# Patient Record
Sex: MALE | Race: BLACK OR AFRICAN AMERICAN | Employment: UNEMPLOYED | URBAN - METROPOLITAN AREA
[De-identification: names, ages, dates, MRNs, and addresses within clinical notes are randomized per-mention and may not be internally consistent; named-entity substitution may affect disease eponyms.]

---

## 2017-04-14 ENCOUNTER — HOSPITAL ENCOUNTER (OUTPATIENT)
Dept: NUTRITION | Age: 67
Discharge: HOME | End: 2017-04-14
Payer: MEDICARE

## 2017-04-14 PROCEDURE — 97802 MEDICAL NUTRITION INDIV IN: CPT

## 2017-04-14 NOTE — PROGRESS NOTES
[address] [place] [address], [address] [place] [address] [phone]   Nutrition Assessment  Medical Nutrition Therapy   Outpatient  Initial Evaluation         Patient Name: Taye Recio. : 1950   Treatment Diagnosis: Diabetes Onset Date:    Referral Source: [facility], Not On File, MD Start of Care [facility]): 2017     Ht:  68 in  cm Wt: 245 lb  kg   BMI: 37.3  BMR male 18   BMR female      Diagnosis: Diabetes        Medications of Nutritional Significance:   Lantus, Novalog     Subjective/Assessment: Client is a 78year old male. He is being seen today to receive information on a diabetic diet and for weight loss. He has a history of liver and kidney transplant. He is not as active as he would like to be due to pain in his arm from an old fistula and back pain. This does cause him to be depressed. He was A1c was 6.3 back in 2017. He has good control over his blood sugars. He lives with his wife and granddaughter. They both do the grocery shopping and the cooking. He enjoys eating a varied diet. Current Eating Patterns: Client's meals are inconsistent in carbohydrate intake. He does not count carbohydrates. His breakfast is high in carbohydrate intake with no protein and then his lunch and dinner has minimal carbohydrates. His meal and snack timing is also inconsistent and can vary from day to day depending on what is going on with appointments. His snack intake can vary as well.        Handouts/  Information Provided: [x]  Carbohydrates  [x]  Protein  [x]  Fiber  []  Serving Sizes  [x]  Meal and Snack Ideas  []  Food Journals [x]  Diabetes  []  Cholesterol  []  Sodium  [x]  Gen Nutr Guidelines  []  SBGM Guidelines  []  Others:    Estimate Needs:   Calories: 1600 Protein: 100 Carbs: 180 Fat: 53   Kcal/day  g/day  g/day  g/day         percent: 25 percent: 45 percent: 30     Nutritional Goal - To promote lifestyle changes to result in:    [x]  weight loss  [x]  Improved diabetic control  []  Decreased cholesterol levels  []  Decreased blood pressure  []  weight maintenance []  Preventing any interactions associated with food allergies  []  Adequate weight gain toward goal weight  []  Other:          Recommendations: 1. Always pair a carbohydrate with a protein for all snacks and meals. 2. Be consistent in your carbohydrate intake for all meals and snacks. 3. Eat your meals and your snacks at the same time of the day.    4. Use the Plate Method for lunch and dinner      Information Reviewed with: Patient and wife   Potential Barriers to Learning: None      Dietitian Signature: Karen Rodriguez M.S.Ed, BSN, RN Date: 4/14/2017   Follow-up: Tuesday May 16 @10am Time: 2:54 PM

## 2017-05-16 ENCOUNTER — HOSPITAL ENCOUNTER (OUTPATIENT)
Dept: NUTRITION | Age: 67
Discharge: HOME | End: 2017-05-16
Payer: MEDICARE

## 2017-05-16 PROCEDURE — 97803 MED NUTRITION INDIV SUBSEQ: CPT

## 2017-05-16 NOTE — PROGRESS NOTES
NUTRITION  DAILY TREATMENT NOTE  Patient Name: Robyn Caceres. Date: 2017  : 1950    YES Patient  Verified  Insurance: Payor: Ashley London / Plan: [place] & B / Product Type: Medicare /    Diagnosis: In time:   10             Out time:   10:30   Total Treatment Time (min):   30 mins     SUBJECTIVE    Medication Changes/New allergies or changes in medical history, any new surgeries or procedures? NO    If yes, update Summary List   Subjective Functional Status/Changes:  []  No changes reported   Mr. Tiffanie Encarnacion is continuing to make dietary changes in his lifestyle. Sometimes he still has trouble eating enough food during the day because of lack of appetite but he tries to eat some type of food. During these times, he does not make the healthiest choice (yesterday he ate a hot dog because he had a taste for them) and chicken wings. Most days he eats 2-3 servings of fruit and 2-3 servings of vegetables. He is losing some weight which he is very happy about. During the 4 weeks, he lost almost 2 pounds. He remains very upbeat and positive. Current Wt: 243.2 Previous Wt: 245 Wt Change: -1.8     OBJECTIVE    Patient Education:  []  Review current plan with patient   []  Other:    Handouts/  Information Provided: []  Carbohydrates  []  Protein  []  Fiber  []  Serving Sizes  []  Fluids  []  General guidelines []  Diabetes  []  Cholesterol  []  Sodium  []  SBGM  []  Food Journals  []  Others:    Estimated Needs:        Calories Protein CHO Fat     ASSESSMENT    []  Pt Progressing Well  [x]  Slow Progress []  No Progress    Other:    []  Understand Dietary Changes/Recs []  No Change [x]  Improving []  N/A   []  Weight []  No Change []  Improving []  N/A     Glucose Levels []  No Change []  Improving []  N/A   []  Cholesterol Levels []  No Change []  Improving []  N/A     RECOMMENDATIONS    1.  Never eat a carbohydrate alone so always eat some protein with the carbohydrate. 2. Limit processed meat to no more than twice a week. 3. Look up recipes for cold quinoa and bean salads and try a recipe within the next 2 weeks. Email me and tell me what you thought of it. 4.    5.       PLAN    [x]  Continue on current plan []  Follow-up PRN   []  Discharge due to :    [x]  Next Appt[de-identified] Tuesday July [email or web address]      Dietitian: Lauren Sanabria M.S.Ed, BSN, RN    Date: 5/16/2017 Time: 10:53 AM

## 2018-08-02 PROBLEM — E66.01 SEVERE OBESITY (BMI 35.0-39.9): Status: ACTIVE | Noted: 2018-08-02

## 2021-09-16 ENCOUNTER — HOSPITAL ENCOUNTER (EMERGENCY)
Age: 71
Discharge: HOME | End: 2021-09-16
Attending: EMERGENCY MEDICINE
Payer: MEDICARE

## 2021-09-16 VITALS
DIASTOLIC BLOOD PRESSURE: 84 MMHG | OXYGEN SATURATION: 100 % | HEART RATE: 84 BPM | HEIGHT: 68 IN | WEIGHT: 245 LBS | TEMPERATURE: 98.6 F | BODY MASS INDEX: 37.13 KG/M2 | RESPIRATION RATE: 21 BRPM | SYSTOLIC BLOOD PRESSURE: 142 MMHG

## 2021-09-16 DIAGNOSIS — G25.2 COARSE TREMORS: Primary | ICD-10-CM

## 2021-09-16 LAB
ALBUMIN SERPL-MCNC: 3.6 G/DL (ref 3.4–5)
ALBUMIN/GLOB SERPL: 1.1 {RATIO} (ref 0.8–1.7)
ALP SERPL-CCNC: 62 U/L (ref 45–117)
ALT SERPL-CCNC: 12 U/L (ref 16–61)
ANION GAP SERPL CALC-SCNC: 8 MMOL/L (ref 3–18)
AST SERPL-CCNC: 6 U/L (ref 10–38)
BASOPHILS # BLD: 0 K/UL (ref 0–0.1)
BASOPHILS NFR BLD: 0 % (ref 0–2)
BILIRUB SERPL-MCNC: 0.4 MG/DL (ref 0.2–1)
BUN SERPL-MCNC: 72 MG/DL (ref 7–18)
BUN/CREAT SERPL: 10 (ref 12–20)
CALCIUM SERPL-MCNC: 9.1 MG/DL (ref 8.5–10.1)
CHLORIDE SERPL-SCNC: 104 MMOL/L (ref 100–111)
CO2 SERPL-SCNC: 28 MMOL/L (ref 21–32)
CREAT SERPL-MCNC: 7.12 MG/DL (ref 0.6–1.3)
DIFFERENTIAL METHOD BLD: ABNORMAL
EOSINOPHIL # BLD: 0 K/UL (ref 0–0.4)
EOSINOPHIL NFR BLD: 0 % (ref 0–5)
ERYTHROCYTE [DISTWIDTH] IN BLOOD BY AUTOMATED COUNT: 14 % (ref 11.6–14.5)
GLOBULIN SER CALC-MCNC: 3.4 G/DL (ref 2–4)
GLUCOSE SERPL-MCNC: 121 MG/DL (ref 74–99)
HCT VFR BLD AUTO: 26 % (ref 36–48)
HGB BLD-MCNC: 8.1 G/DL (ref 13–16)
LYMPHOCYTES # BLD: 1.2 K/UL (ref 0.9–3.6)
LYMPHOCYTES NFR BLD: 16 % (ref 21–52)
MAGNESIUM SERPL-MCNC: 2.1 MG/DL (ref 1.6–2.6)
MCH RBC QN AUTO: 30.1 PG (ref 24–34)
MCHC RBC AUTO-ENTMCNC: 31.2 G/DL (ref 31–37)
MCV RBC AUTO: 96.7 FL (ref 78–100)
MONOCYTES # BLD: 0.8 K/UL (ref 0.05–1.2)
MONOCYTES NFR BLD: 11 % (ref 3–10)
NEUTS SEG # BLD: 5.5 K/UL (ref 1.8–8)
NEUTS SEG NFR BLD: 72 % (ref 40–73)
PLATELET # BLD AUTO: 215 K/UL (ref 135–420)
PMV BLD AUTO: 11.5 FL (ref 9.2–11.8)
POTASSIUM SERPL-SCNC: 4 MMOL/L (ref 3.5–5.5)
PROT SERPL-MCNC: 7 G/DL (ref 6.4–8.2)
RBC # BLD AUTO: 2.69 M/UL (ref 4.35–5.65)
SODIUM SERPL-SCNC: 140 MMOL/L (ref 136–145)
WBC # BLD AUTO: 7.6 K/UL (ref 4.6–13.2)

## 2021-09-16 PROCEDURE — 80053 COMPREHEN METABOLIC PANEL: CPT

## 2021-09-16 PROCEDURE — 85025 COMPLETE CBC W/AUTO DIFF WBC: CPT

## 2021-09-16 PROCEDURE — 99282 EMERGENCY DEPT VISIT SF MDM: CPT

## 2021-09-16 PROCEDURE — 74011250636 HC RX REV CODE- 250/636: Performed by: PHYSICIAN ASSISTANT

## 2021-09-16 PROCEDURE — 96374 THER/PROPH/DIAG INJ IV PUSH: CPT

## 2021-09-16 PROCEDURE — 83735 ASSAY OF MAGNESIUM: CPT

## 2021-09-16 RX ORDER — LORAZEPAM 2 MG/ML
1 INJECTION INTRAMUSCULAR
Status: COMPLETED | OUTPATIENT
Start: 2021-09-16 | End: 2021-09-16

## 2021-09-16 RX ADMIN — LORAZEPAM 1 MG: 2 INJECTION INTRAMUSCULAR; INTRAVENOUS at 13:57

## 2021-09-16 NOTE — ED PROVIDER NOTES
EMERGENCY DEPARTMENT HISTORY AND PHYSICAL EXAM      Date: 9/16/2021  Patient Name: Ruperto Wong. History of Presenting Illness     Chief Complaint   Patient presents with    Abnormal Lab Results       History Provided By: Patient    HPI: Ruperto Wong., 73 y.o. male PMHx significant for cholesterolemia, ESRD, COPD, DM, anemia BPH, GERD, dyslipidemia, CAD, schizoaffective disorder, sleep apnea, lyme disease, shingles, presents ambulatory to the ED. Pt reports involuntary movements began yesterday after dialysis. Patient reports similar sx occurred about 2 months ago and he underwent full neurological testing. Pt reports last time sx were fully relieved with IV Ativan. Denies CP, SOB, dizziness. Denies new medication. Denies headache, blurred vision, dizziness, weakness. Pt has not taken anything for sx. Pt reports he has attended dialysis as scheduled, MWF. There are no other complaints, changes, or physical findings at this time. PCP: Ana Ochoa MD    No current facility-administered medications on file prior to encounter. Current Outpatient Medications on File Prior to Encounter   Medication Sig Dispense Refill    finasteride (PROSCAR) 5 mg tablet Take 5 mg by mouth daily.  pregabalin (LYRICA) 25 mg capsule Take  by mouth.  capsicum oleoresin (TRIXAICIN) 0.025 % topical cream Apply  to affected area three (3) times daily.  diazePAM (VALIUM) 5 mg tablet Take 5 mg by mouth every six (6) hours as needed for Anxiety.  mirabegron ER (MYRBETRIQ) 25 mg ER tablet Take 1 Tab by mouth daily. 90 Tab 3    amLODIPine (NORVASC) 10 mg tablet Take  by mouth daily.  mineral oil-hydrophil petrolat (AQUAPHOR) ointment Apply  to affected area as needed for Dry Skin.  aspirin delayed-release 81 mg tablet Take  by mouth daily.  atovaquone-proguanil (MALARONE) 250-100 mg per tablet Take  by mouth.  bumetanide (BUMEX) 0.5 mg tablet Take  by mouth daily.       carboxymethylcellulose sodium 1 % dlgl Apply  to eye.  cholecalciferol (VITAMIN D3) 1,000 unit cap Take  by mouth daily.  docusate sodium (COLACE) 100 mg capsule Take 100 mg by mouth two (2) times a day.  hydrALAZINE (APRESOLINE) 10 mg tablet Take  by mouth.  insulin aspart (NOVOLOG) 100 unit/mL inpn by SubCUTAneous route.  isosorbide mononitrate (ISMO, MONOKET) 20 mg tablet Take  by mouth two (2) times a day.  insulin glargine (LANTUS,BASAGLAR) 100 unit/mL (3 mL) inpn by SubCUTAneous route.  labetalol (NORMODYNE) 100 mg tablet Take  by mouth two (2) times a day.  lidocaine (XYLOCAINE) 4 % topical cream Apply  to affected area.  LORazepam (ATIVAN) 0.5 mg tablet Take  by mouth.  Magnesium Oxide 500 mg cap Take  by mouth.  meclizine (ANTIVERT) 12.5 mg tablet Take  by mouth three (3) times daily as needed.  methocarbamol (ROBAXIN) 500 mg tablet Take  by mouth four (4) times daily.  mycophenolate sodium (MYFORTIC) 360 mg delayed release tablet Take  by mouth two (2) times a day.  tacrolimus (PROGRAF) 1 mg capsule Take  by mouth every twelve (12) hours.  terazosin (HYTRIN) 1 mg capsule Take 5 mg by mouth nightly.  traMADol (ULTRAM) 50 mg tablet Take 50 mg by mouth every six (6) hours as needed for Pain.          Past History     Past Medical History:  Past Medical History:   Diagnosis Date    Anemia     Anemia in chronic kidney disease     Asthma     Benign prostatic hyperplasia with lower urinary tract symptoms     BPH (benign prostatic hyperplasia)     Cardiac arrhythmia     Carpal tunnel syndrome     Cirrhosis (HCC)     CKD (chronic kidney disease)     COPD (chronic obstructive pulmonary disease) (HCC)     Coronary artery disease     Diabetes ([address]     type 2    Diabetic peripheral neuropathy associated with type 2 diabetes mellitus ([address]     Diabetic retinopathy, nonproliferative, mild ([address]     Dyslipidemia     ED (erectile dysfunction)     Esophageal reflux     ESRD (end stage renal disease) on dialysis (HCC)     Foot drop     GERD (gastroesophageal reflux disease)     Heart abnormality     Hepatic encephalopathy (HCC)     Hepatitis C     History of blood transfusion     History of stroke     Hypercholesterolemia     Hypertension     Hypomagnesemia     Kidney disease     Lyme disease     Microhematuria     Nephrotic range proteinuria     OAB (overactive bladder)     Prostatism     PTSD (post-traumatic stress disorder)     Schizoaffective disorder (HCC)     Schwannoma of cranial nerve (HCC)     Secondary hyperparathyroidism ([address]     Shingles     Sleep apnea     Spinal stenosis     Subdural hematoma (HCC)     Transplanted kidney     Transplanted liver ([address]     Vision decreased        Past Surgical History:  Past Surgical History:   Procedure Laterality Date    HX CHOLECYSTECTOMY      HX COLONOSCOPY  2018    HX LIVER TRANSPLANT  2012    HX MASTECTOMY      HX OTHER SURGICAL      fine needle liver biopsy    HX OTHER SURGICAL  2010, 2010, 2011, 2018    Vasc. Surg.  HX OTHER SURGICAL  2011    Dialysis Cath.  HX OTHER SURGICAL  2013    almas hole    HX RENAL TRANSPLANT  2012       Family History:  Family History   Problem Relation Age of Onset    Diabetes Mother     Hypertension Mother     Hypertension Father    freeman April Arthritis-rheumatoid Sister     Cancer Sister     Diabetes Sister     Hypertension Sister     Diabetes Brother        Social History:  Social History     Tobacco Use    Smoking status: Former Smoker     Packs/day: 1.00     Years: 35.00     Pack years: 35.00     Types: Cigarettes     Quit date:      Years since quittin.7    Smokeless tobacco: Never Used   Substance Use Topics    Alcohol use: No    Drug use: No       Allergies:   Allergies   Allergen Reactions    Acetaminophen Other (comments)     DOES NOT TAKE DUE TO LIVER DISEASE    Adhesive Other (comments)     Cannot use paper tape, REMOVES SKIN    Other Plant, Animal, Environmental Other (comments)     Environmental allergies--Sneezing, runny nose, cough p/pt    Pseudoephedrine Hcl Other (comments)     MAKES PT FEEL BAD    Promethazine Other (comments)     Bright visual scotoma and color distortion         Review of Systems   Review of Systems   Constitutional: Negative for chills and fever. HENT: Negative for facial swelling. Eyes: Negative for photophobia and visual disturbance. Respiratory: Negative for shortness of breath. Cardiovascular: Negative for chest pain. Gastrointestinal: Negative for abdominal pain, nausea and vomiting. Genitourinary: Negative for flank pain. Skin: Negative for color change, pallor, rash and wound. Neurological: Negative for dizziness, weakness, light-headedness and headaches. Involuntary movements   All other systems reviewed and are negative. Physical Exam   Physical Exam  Vitals and nursing note reviewed. Constitutional:       General: He is not in acute distress. Appearance: He is well-developed. Comments: Pt in NAD   HENT:      Head: Normocephalic and atraumatic. Eyes:      Conjunctiva/sclera: Conjunctivae normal.      Comments: PERRL  EOM intact   Cardiovascular:      Rate and Rhythm: Normal rate and regular rhythm. Heart sounds: Normal heart sounds. Pulmonary:      Effort: Pulmonary effort is normal. No respiratory distress. Breath sounds: Normal breath sounds. Abdominal:      General: Bowel sounds are normal.      Palpations: Abdomen is soft. Tenderness: There is no abdominal tenderness. Musculoskeletal:         General: Normal range of motion. Skin:     General: Skin is warm. Findings: No rash. Neurological:      Mental Status: He is alert and oriented to person, place, and time. Cranial Nerves: No cranial nerve deficit.       Comments: Involuntary movements of head, neck and arms  A&Ox4  Speech clear  Gait stable   Psychiatric:         Behavior: Behavior normal.         Diagnostic Study Results     Labs -     Recent Results (from the past 12 hour(s))   CBC WITH AUTOMATED DIFF    Collection Time: 09/16/21  1:53 PM   Result Value Ref Range    WBC 7.6 4.6 - 13.2 K/uL    RBC 2.69 (L) 4.35 - 5.65 M/uL    HGB 8.1 (L) 13.0 - 16.0 g/dL    HCT 26.0 (L) 36.0 - 48.0 %    MCV 96.7 78.0 - 100.0 FL    MCH 30.1 24.0 - 34.0 PG    MCHC 31.2 31.0 - 37.0 g/dL    RDW 14.0 11.6 - 14.5 %    PLATELET 654 304 - 752 K/uL    MPV 11.5 9.2 - 11.8 FL    NEUTROPHILS 72 40 - 73 %    LYMPHOCYTES 16 (L) 21 - 52 %    MONOCYTES 11 (H) 3 - 10 %    EOSINOPHILS 0 0 - 5 %    BASOPHILS 0 0 - 2 %    ABS. NEUTROPHILS 5.5 1.8 - 8.0 K/UL    ABS. LYMPHOCYTES 1.2 0.9 - 3.6 K/UL    ABS. MONOCYTES 0.8 0.05 - 1.2 K/UL    ABS. EOSINOPHILS 0.0 0.0 - 0.4 K/UL    ABS. BASOPHILS 0.0 0.0 - 0.1 K/UL    DF AUTOMATED     METABOLIC PANEL, COMPREHENSIVE    Collection Time: 09/16/21  1:53 PM   Result Value Ref Range    Sodium 140 136 - 145 mmol/L    Potassium 4.0 3.5 - 5.5 mmol/L    Chloride 104 100 - 111 mmol/L    CO2 28 21 - 32 mmol/L    Anion gap 8 3.0 - 18 mmol/L    Glucose 121 (H) 74 - 99 mg/dL    BUN 72 (H) 7.0 - 18 MG/DL    Creatinine 7.12 (H) 0.6 - 1.3 MG/DL    BUN/Creatinine ratio 10 (L) 12 - 20      GFR est AA 9 (L) >60 ml/min/1.73m2    GFR est non-AA 8 (L) >60 ml/min/1.73m2    Calcium 9.1 8.5 - 10.1 MG/DL    Bilirubin, total 0.4 0.2 - 1.0 MG/DL    ALT (SGPT) 12 (L) 16 - 61 U/L    AST (SGOT) 6 (L) 10 - 38 U/L    Alk.  phosphatase 62 45 - 117 U/L    Protein, total 7.0 6.4 - 8.2 g/dL    Albumin 3.6 3.4 - 5.0 g/dL    Globulin 3.4 2.0 - 4.0 g/dL    A-G Ratio 1.1 0.8 - 1.7     MAGNESIUM    Collection Time: 09/16/21  1:53 PM   Result Value Ref Range    Magnesium 2.1 1.6 - 2.6 mg/dL       Radiologic Studies -   No orders to display     CT Results  (Last 48 hours)    None        CXR Results  (Last 48 hours)    None Medical Decision Making   I am the first provider for this patient. I reviewed the vital signs, available nursing notes, past medical history, past surgical history, family history and social history. Vital Signs-Reviewed the patient's vital signs. Patient Vitals for the past 12 hrs:   Temp Pulse Resp BP SpO2   09/16/21 1219 98.6 °F (37 °C) 84 21 (!) 142/84 100 %       Records Reviewed: Nursing Notes and Old Medical Records    Provider Notes (Medical Decision Making):   DDx: Involuntary movements, Electrolyte abnormality, Dehydration, Medication reaction    72 yo M who presents with  events that began after dialysis yesterday. Patient reports this previously occurred about 2 months ago and was resolved with IV Ativan. Patient had previous work-up with normal MRI brain. Labs today show stable potassium with creatinine that is normal compared to patient's most recent labs. Patient was dialyzed yesterday. Also hemoglobin is stable due to patient's most recent labs. Symptoms were resolved with IV Ativan. At time of discharge, pt non-toxic appearing in NAD. Pt stable for prompt outpatient follow-up with PCP 1 to 2 days. Patient given strict instructions to return if symptoms worsen. ED Course:   Initial assessment performed. The patients presenting problems have been discussed, and they are in agreement with the care plan formulated and outlined with them. I have encouraged them to ask questions as they arise throughout their visit. Chart review:   8/26 - hgb 7.8, Cr 9.5  7/8/2021:  MR head w/o contrast: Negative for acute infarct. No acute intracranial hemorrhage. There is no focal abnormal signal to correspond with the excess soft tissue density seen in the paraclinoid region on head CT. No definite meningioma or parenchymal mass.   -Patient has end-stage renal disease on dialysis.    -Patient with tremor and difficulty holding still for the exam making contrast of questionable benefit.   -Consider a follow-up brain MRI in 6 months to reassess, possibly coordinated with the dialysis following the scan to provide contrast-enhanced images. Chronic microvascular ischemic changes in the cerebral white matter. Multiple chronic deep brain lacunar infarcts. Previous left frontal almas hole with underlying encephalomalacia. Could be related to a previous ventricular shunt catheter. Correlation with patient history would be most useful. 1518: Pt asleep upon re-evaluation. Once awake, pt no longer exhibits tremors. Wife in agreement. Pt requesting discharge. Disposition:  3:19 PM  Discussed lab results with pt along with dx and treatment plan. Discussed importance of PCP follow up. All questions answered. Pt voiced they understood. Return if sx worsen. PLAN:  1. Current Discharge Medication List        2. Follow-up Information     Follow up With Specialties Details Why Contact Edda Mejia MD Internal Medicine Schedule an appointment as soon as possible for a visit in 1 day  [address] [address]      [id number] [facility] EMERGENCY DEPT Emergency Medicine  As needed, If symptoms worsen [address] [place] [address]  [phone]        Return to ED if worse     Diagnosis     Clinical Impression:   1. Coarse tremors        Attestations:    YOLANDA Linda    Please note that this dictation was completed with [email or web address], the computer voice recognition software. Quite often unanticipated grammatical, syntax, homophones, and other interpretive errors are inadvertently transcribed by the computer software. Please disregard these errors. Please excuse any errors that have escaped final proofreading. Thank you.

## 2023-01-31 RX ORDER — CARBOXYMETHYLCELLULOSE SODIUM 10 MG/ML
GEL OPHTHALMIC
COMMUNITY

## 2023-01-31 RX ORDER — MYCOPHENOLIC ACID 360 MG/1
TABLET, DELAYED RELEASE ORAL 2 TIMES DAILY
COMMUNITY

## 2023-01-31 RX ORDER — ISOSORBIDE MONONITRATE 20 MG/1
TABLET ORAL 2 TIMES DAILY
COMMUNITY

## 2023-01-31 RX ORDER — MECLIZINE HCL 12.5 MG/1
TABLET ORAL 3 TIMES DAILY PRN
COMMUNITY

## 2023-01-31 RX ORDER — TACROLIMUS 1 MG/1
CAPSULE ORAL EVERY 12 HOURS
COMMUNITY

## 2023-01-31 RX ORDER — LIDOCAINE 40 MG/G
CREAM TOPICAL
COMMUNITY

## 2023-01-31 RX ORDER — TERAZOSIN 1 MG/1
5 CAPSULE ORAL
COMMUNITY

## 2023-01-31 RX ORDER — DIAZEPAM 5 MG/1
5 TABLET ORAL EVERY 6 HOURS PRN
COMMUNITY

## 2023-01-31 RX ORDER — ASPIRIN 81 MG/1
TABLET ORAL DAILY
COMMUNITY

## 2023-01-31 RX ORDER — HYDRALAZINE HYDROCHLORIDE 10 MG/1
TABLET, FILM COATED ORAL
COMMUNITY

## 2023-01-31 RX ORDER — CAPSAICIN 0.025 %
CREAM (GRAM) TOPICAL 3 TIMES DAILY
COMMUNITY

## 2023-01-31 RX ORDER — INSULIN GLARGINE 100 [IU]/ML
INJECTION, SOLUTION SUBCUTANEOUS
COMMUNITY

## 2023-01-31 RX ORDER — FINASTERIDE 5 MG/1
5 TABLET, FILM COATED ORAL DAILY
COMMUNITY

## 2023-01-31 RX ORDER — AMLODIPINE BESYLATE 10 MG/1
TABLET ORAL DAILY
COMMUNITY

## 2023-01-31 RX ORDER — TRAMADOL HYDROCHLORIDE 50 MG/1
50 TABLET ORAL EVERY 6 HOURS PRN
COMMUNITY

## 2023-01-31 RX ORDER — LORAZEPAM 0.5 MG/1
TABLET ORAL
COMMUNITY

## 2023-01-31 RX ORDER — METHOCARBAMOL 500 MG/1
TABLET, FILM COATED ORAL 4 TIMES DAILY
COMMUNITY

## 2023-01-31 RX ORDER — PREGABALIN 25 MG/1
CAPSULE ORAL
COMMUNITY

## 2023-01-31 RX ORDER — PSEUDOEPHEDRINE HCL 30 MG
100 TABLET ORAL 2 TIMES DAILY
COMMUNITY

## 2023-01-31 RX ORDER — FOLIC ACID 0.8 MG
TABLET ORAL
COMMUNITY

## 2023-01-31 RX ORDER — LABETALOL 100 MG/1
TABLET, FILM COATED ORAL 2 TIMES DAILY
COMMUNITY

## 2023-01-31 RX ORDER — ATOVAQUONE AND PROGUANIL HYDROCHLORIDE 250; 100 MG/1; MG/1
TABLET, FILM COATED ORAL
COMMUNITY

## 2023-01-31 RX ORDER — BUMETANIDE 0.5 MG/1
TABLET ORAL DAILY
COMMUNITY